# Patient Record
Sex: FEMALE | Race: WHITE | NOT HISPANIC OR LATINO | Employment: FULL TIME | URBAN - METROPOLITAN AREA
[De-identification: names, ages, dates, MRNs, and addresses within clinical notes are randomized per-mention and may not be internally consistent; named-entity substitution may affect disease eponyms.]

---

## 2021-05-28 ENCOUNTER — HOSPITAL ENCOUNTER (EMERGENCY)
Facility: MEDICAL CENTER | Age: 61
End: 2021-05-28
Attending: EMERGENCY MEDICINE
Payer: COMMERCIAL

## 2021-05-28 ENCOUNTER — APPOINTMENT (OUTPATIENT)
Dept: RADIOLOGY | Facility: MEDICAL CENTER | Age: 61
End: 2021-05-28
Attending: EMERGENCY MEDICINE
Payer: COMMERCIAL

## 2021-05-28 VITALS
WEIGHT: 143.3 LBS | HEART RATE: 61 BPM | OXYGEN SATURATION: 99 % | BODY MASS INDEX: 26.37 KG/M2 | DIASTOLIC BLOOD PRESSURE: 75 MMHG | TEMPERATURE: 98.1 F | SYSTOLIC BLOOD PRESSURE: 131 MMHG | HEIGHT: 62 IN | RESPIRATION RATE: 18 BRPM

## 2021-05-28 DIAGNOSIS — R11.2 NAUSEA AND VOMITING, INTRACTABILITY OF VOMITING NOT SPECIFIED, UNSPECIFIED VOMITING TYPE: ICD-10-CM

## 2021-05-28 DIAGNOSIS — S09.90XA CLOSED HEAD INJURY, INITIAL ENCOUNTER: ICD-10-CM

## 2021-05-28 DIAGNOSIS — S01.01XA LACERATION OF SCALP, INITIAL ENCOUNTER: ICD-10-CM

## 2021-05-28 LAB — EKG IMPRESSION: NORMAL

## 2021-05-28 PROCEDURE — 304217 HCHG IRRIGATION SYSTEM

## 2021-05-28 PROCEDURE — 700101 HCHG RX REV CODE 250: Performed by: EMERGENCY MEDICINE

## 2021-05-28 PROCEDURE — 99284 EMERGENCY DEPT VISIT MOD MDM: CPT

## 2021-05-28 PROCEDURE — 70450 CT HEAD/BRAIN W/O DYE: CPT

## 2021-05-28 PROCEDURE — 304999 HCHG REPAIR-SIMPLE/INTERMED LEVEL 1

## 2021-05-28 PROCEDURE — 93005 ELECTROCARDIOGRAM TRACING: CPT

## 2021-05-28 PROCEDURE — 305308 HCHG STAPLER,SKIN,DISP.

## 2021-05-28 RX ORDER — LIDOCAINE HYDROCHLORIDE AND EPINEPHRINE 10; 10 MG/ML; UG/ML
20 INJECTION, SOLUTION INFILTRATION; PERINEURAL ONCE
Status: COMPLETED | OUTPATIENT
Start: 2021-05-28 | End: 2021-05-28

## 2021-05-28 RX ADMIN — LIDOCAINE HYDROCHLORIDE AND EPINEPHRINE 20 ML: 10; 10 INJECTION, SOLUTION INFILTRATION; PERINEURAL at 12:30

## 2021-05-28 NOTE — ED TRIAGE NOTES
"Presents complaining of occipital head pain after incurring a GLF on her tile kitchen floor, earlier this AM.  She reports a consequent LOC.   Chief Complaint   Patient presents with   • T-5000 FALL   • Head Injury     /56   Pulse 62   Temp 36.3 °C (97.3 °F) (Temporal)   Resp 20   Ht 1.575 m (5' 2\")   Wt 65 kg (143 lb 4.8 oz)   SpO2 100%   BMI 26.21 kg/m²      "

## 2021-05-28 NOTE — DISCHARGE INSTRUCTIONS
Rinse blood out of your hair when you get home and then leave the scalp dry for about 3 days.  Return at once if you develop new or worsening symptoms or you would like further evaluation or treatment.  Return here in 10 days for staple removal.

## 2021-05-28 NOTE — ED NOTES
D/C instn reviewed Return for s/s infection or neurologic s/s Pt states feels better D/C ambul Stable reassured condn

## 2021-05-28 NOTE — ED PROVIDER NOTES
"ED Provider Note    CHIEF COMPLAINT  Chief Complaint   Patient presents with   • T-5000 FALL   • Head Injury   • Loss of Consciousness       HPI  Celestina Bond is a 60 y.o. female who presents to the emergency department complaining of injury to the back of the head.  The patient says that she woke up, as usual around 2:30 AM this morning she then ran several miles on her treadmill and felt fine, she then drank celery juice which tasted rancid and shortly after that began experiencing nausea and vomiting.  She felt very weak and states that she \"passed out\" the patient says that she has had episodes of passing out associated with nausea in the past.  Today after passing out she woke up on the floor and noticed that she had a laceration to the occipital part of the scalp.  She is also been having episodes of diarrhea since the onset of symptoms.  Otherwise she does not recognize any exacerbating or alleviating factors or other precipitating events    REVIEW OF SYSTEMS no black or bloody stool or emesis no cervical pain no chest pain or hemoptysis.  All other systems negative    PAST MEDICAL HISTORY  No past medical history on file.    FAMILY HISTORY  No family history on file.    SOCIAL HISTORY  Social History     Socioeconomic History   • Marital status:      Spouse name: Not on file   • Number of children: Not on file   • Years of education: Not on file   • Highest education level: Not on file   Occupational History   • Not on file   Tobacco Use   • Smoking status: Not on file   Substance and Sexual Activity   • Alcohol use: Not on file   • Drug use: Not on file   • Sexual activity: Not on file   Other Topics Concern   • Not on file   Social History Narrative   • Not on file     Social Determinants of Health     Financial Resource Strain:    • Difficulty of Paying Living Expenses:    Food Insecurity:    • Worried About Running Out of Food in the Last Year:    • Ran Out of Food in the Last Year:  " "  Transportation Needs:    • Lack of Transportation (Medical):    • Lack of Transportation (Non-Medical):    Physical Activity:    • Days of Exercise per Week:    • Minutes of Exercise per Session:    Stress:    • Feeling of Stress :    Social Connections:    • Frequency of Communication with Friends and Family:    • Frequency of Social Gatherings with Friends and Family:    • Attends [employer] Services:    • Active Member of Clubs or Organizations:    • Attends Club or Organization Meetings:    • Marital Status:    Intimate Partner Violence:    • Fear of Current or Ex-Partner:    • Emotionally Abused:    • Physically Abused:    • Sexually Abused:        SURGICAL HISTORY  No past surgical history on file.    CURRENT MEDICATIONS  Home Medications    **Home medications have not yet been reviewed for this encounter**         ALLERGIES  Allergies   Allergen Reactions   • Bee Anaphylaxis   • [place] Flavor Anaphylaxis       PHYSICAL EXAM  VITAL SIGNS: /75   Pulse 61   Temp 36.3 °C (97.3 °F) (Temporal)   Resp 18   Ht 1.575 m (5' 2\")   Wt 65 kg (143 lb 4.8 oz)   SpO2 99%   BMI 26.21 kg/m²    Oxygen saturation is interpreted as adequate  Constitutional: The patient is awake she is lucid she carries on a normal conversation, she does seem uncomfortable with nausea at the time of arrival but she is able to understand and appreciate everything I am telling her and currently has the capacity to make her own medical decisions.  HENT: There is a stellate wound to the occipital part of the scalp consistent with her head striking the floor and the skin is highly macerated in that area, total length is approximately 4 cm  Eyes: Pupils round extraocular motion present  Neck: No midline cervical tenderness the patient is moving her neck with no inhibition or apparent difficulty  Cardiovascular: Regular rate and rhythm  Lungs: Clear and equal bilaterally with no apparent difficulty breathing  Abdomen/Back: Soft nontender " nondistended no rebound guarding or peritoneal findings  Skin: Warm and dry  Musculoskeletal: No acute bony deformity  Neurologic: Awake lucid verbal moving all extremities without difficulty    Radiology  CT-HEAD W/O   Final Result      1.  Right parietal scalp laceration without evidence of skull fracture or intracranial hemorrhage.            PROCEDURES  The area about the scalp wound was locally infiltrated with lidocaine and epinephrine to achieve adequate anesthesia.  The area was then irrigated with a jet irrigation system.  The wound was inspected and the skin was found to be highly macerated around the edges of the laceration but there was no deep structure involvement or exposure of skull or galea.  The wound was stapled closed with 4 scalp staples.    MEDICAL DECISION MAKING and DISPOSITION  In the emergency department the patient is lucid and capable of understanding what I am telling her and capable of making her own medical decisions and I have recommended that we place an IV and do an evaluation for syncopal episode but the patient refuses an IV, she refuses any lab work or additional work-up stating that she just wants to make sure her head is okay.  I have offered intravenous medications as well as oral medications for her symptoms of nausea and she declines these as well.  Over time the patient's symptoms do seem to be improving she looks a lot more comfortable but says she still having some nausea when she moves around.  I have once again recommended that we establish an IV, check lab work, provide nausea medications and other interventions but the patient declines.  At this point in time she says she wants to go home, I have reviewed wound care instructions with her she is to keep the area clean and dry.  The patient is advised to return here in 10 days for staple removal and otherwise if she changes her mind or would like further evaluation or treatment or has additional concerns she is to  return here immediately for recheck    IMPRESSION  1.  Nausea and vomiting  2.  Syncopal episode  3.  Centimeter laceration to the scalp stapled in the emergency department         Electronically signed by: Roland Urbina M.D., 5/28/2021 1:47 PM

## 2021-06-06 ENCOUNTER — HOSPITAL ENCOUNTER (EMERGENCY)
Facility: MEDICAL CENTER | Age: 61
End: 2021-06-06
Payer: COMMERCIAL

## 2021-06-06 VITALS
RESPIRATION RATE: 20 BRPM | TEMPERATURE: 97 F | HEART RATE: 60 BPM | HEIGHT: 62 IN | BODY MASS INDEX: 25.96 KG/M2 | WEIGHT: 141.09 LBS | DIASTOLIC BLOOD PRESSURE: 66 MMHG | OXYGEN SATURATION: 95 % | SYSTOLIC BLOOD PRESSURE: 120 MMHG

## 2021-06-06 PROCEDURE — 99281 EMR DPT VST MAYX REQ PHY/QHP: CPT

## 2021-06-06 NOTE — ED TRIAGE NOTES
"Pt was seen in our department the 28 th of this month for an occipital laceration.   4 staples were removed, today.  Wound edges appear well approximated, and in rapid healing process without any sign of infection.  Wound care, and F/U care have been provided.  Pt is urged to return for any fever, or unusual pain affecting the surgical site.  He verbalizes understanding.    Chief Complaint   Patient presents with   • Staple Removal     /66   Pulse 60   Temp 36.1 °C (97 °F) (Temporal)   Resp 20   Ht 1.575 m (5' 2\")   Wt 64 kg (141 lb 1.5 oz)   SpO2 95%   BMI 25.81 kg/m²      "

## 2021-08-05 ENCOUNTER — TELEPHONE (OUTPATIENT)
Dept: CARDIOLOGY | Facility: MEDICAL CENTER | Age: 61
End: 2021-08-05

## 2021-08-05 NOTE — TELEPHONE ENCOUNTER
Called and left message for patient to call back to schedule a sooner cardiology appt. Left direct extension   -Najma

## 2022-07-14 ENCOUNTER — HOSPITAL ENCOUNTER (OUTPATIENT)
Dept: RADIOLOGY | Facility: MEDICAL CENTER | Age: 62
End: 2022-07-14
Payer: COMMERCIAL

## 2022-07-25 ENCOUNTER — HOSPITAL ENCOUNTER (OUTPATIENT)
Dept: LAB | Facility: MEDICAL CENTER | Age: 62
End: 2022-07-25
Attending: INTERNAL MEDICINE
Payer: COMMERCIAL

## 2022-07-25 LAB
ALBUMIN SERPL BCP-MCNC: 4.3 G/DL (ref 3.2–4.9)
ALBUMIN/GLOB SERPL: 1.7 G/DL
ALP SERPL-CCNC: 93 U/L (ref 30–99)
ALT SERPL-CCNC: 11 U/L (ref 2–50)
ANION GAP SERPL CALC-SCNC: 11 MMOL/L (ref 7–16)
AST SERPL-CCNC: 18 U/L (ref 12–45)
BASOPHILS # BLD AUTO: 1.1 % (ref 0–1.8)
BASOPHILS # BLD: 0.03 K/UL (ref 0–0.12)
BILIRUB SERPL-MCNC: 0.6 MG/DL (ref 0.1–1.5)
BUN SERPL-MCNC: 15 MG/DL (ref 8–22)
CALCIUM SERPL-MCNC: 9 MG/DL (ref 8.4–10.2)
CHLORIDE SERPL-SCNC: 101 MMOL/L (ref 96–112)
CHOLEST SERPL-MCNC: 187 MG/DL (ref 100–199)
CO2 SERPL-SCNC: 24 MMOL/L (ref 20–33)
CREAT SERPL-MCNC: 0.57 MG/DL (ref 0.5–1.4)
EOSINOPHIL # BLD AUTO: 0.1 K/UL (ref 0–0.51)
EOSINOPHIL NFR BLD: 3.6 % (ref 0–6.9)
ERYTHROCYTE [DISTWIDTH] IN BLOOD BY AUTOMATED COUNT: 42.8 FL (ref 35.9–50)
EST. AVERAGE GLUCOSE BLD GHB EST-MCNC: 100 MG/DL
FASTING STATUS PATIENT QL REPORTED: NORMAL
GFR SERPLBLD CREATININE-BSD FMLA CKD-EPI: 104 ML/MIN/1.73 M 2
GLOBULIN SER CALC-MCNC: 2.5 G/DL (ref 1.9–3.5)
GLUCOSE SERPL-MCNC: 90 MG/DL (ref 65–99)
HBA1C MFR BLD: 5.1 % (ref 4–5.6)
HCT VFR BLD AUTO: 38.4 % (ref 37–47)
HDLC SERPL-MCNC: 66 MG/DL
HGB BLD-MCNC: 12.7 G/DL (ref 12–16)
IMM GRANULOCYTES # BLD AUTO: 0.01 K/UL (ref 0–0.11)
IMM GRANULOCYTES NFR BLD AUTO: 0.4 % (ref 0–0.9)
LDLC SERPL CALC-MCNC: 107 MG/DL
LYMPHOCYTES # BLD AUTO: 0.66 K/UL (ref 1–4.8)
LYMPHOCYTES NFR BLD: 23.7 % (ref 22–41)
MCH RBC QN AUTO: 30.5 PG (ref 27–33)
MCHC RBC AUTO-ENTMCNC: 33.1 G/DL (ref 33.6–35)
MCV RBC AUTO: 92.1 FL (ref 81.4–97.8)
MONOCYTES # BLD AUTO: 0.2 K/UL (ref 0–0.85)
MONOCYTES NFR BLD AUTO: 7.2 % (ref 0–13.4)
NEUTROPHILS # BLD AUTO: 1.78 K/UL (ref 2–7.15)
NEUTROPHILS NFR BLD: 64 % (ref 44–72)
NRBC # BLD AUTO: 0 K/UL
NRBC BLD-RTO: 0 /100 WBC
PLATELET # BLD AUTO: 186 K/UL (ref 164–446)
PMV BLD AUTO: 11 FL (ref 9–12.9)
POTASSIUM SERPL-SCNC: 4.3 MMOL/L (ref 3.6–5.5)
PROT SERPL-MCNC: 6.8 G/DL (ref 6–8.2)
RBC # BLD AUTO: 4.17 M/UL (ref 4.2–5.4)
SODIUM SERPL-SCNC: 136 MMOL/L (ref 135–145)
TRIGL SERPL-MCNC: 68 MG/DL (ref 0–149)
TSH SERPL DL<=0.005 MIU/L-ACNC: 1.33 UIU/ML (ref 0.38–5.33)
URATE SERPL-MCNC: 3.6 MG/DL (ref 1.9–8.2)
WBC # BLD AUTO: 2.8 K/UL (ref 4.8–10.8)

## 2022-07-25 PROCEDURE — 80053 COMPREHEN METABOLIC PANEL: CPT

## 2022-07-25 PROCEDURE — 83036 HEMOGLOBIN GLYCOSYLATED A1C: CPT

## 2022-07-25 PROCEDURE — 84443 ASSAY THYROID STIM HORMONE: CPT

## 2022-07-25 PROCEDURE — 84550 ASSAY OF BLOOD/URIC ACID: CPT

## 2022-07-25 PROCEDURE — 85025 COMPLETE CBC W/AUTO DIFF WBC: CPT

## 2022-07-25 PROCEDURE — 36415 COLL VENOUS BLD VENIPUNCTURE: CPT

## 2022-07-25 PROCEDURE — 80061 LIPID PANEL: CPT

## 2022-08-11 ENCOUNTER — HOSPITAL ENCOUNTER (OUTPATIENT)
Dept: RADIOLOGY | Facility: MEDICAL CENTER | Age: 62
End: 2022-08-11
Attending: INTERNAL MEDICINE
Payer: COMMERCIAL

## 2022-08-11 DIAGNOSIS — Z12.31 VISIT FOR SCREENING MAMMOGRAM: ICD-10-CM

## 2022-08-11 PROCEDURE — 77063 BREAST TOMOSYNTHESIS BI: CPT

## 2023-06-25 ENCOUNTER — APPOINTMENT (OUTPATIENT)
Dept: RADIOLOGY | Facility: MEDICAL CENTER | Age: 63
End: 2023-06-25
Attending: EMERGENCY MEDICINE
Payer: COMMERCIAL

## 2023-06-25 ENCOUNTER — HOSPITAL ENCOUNTER (EMERGENCY)
Facility: MEDICAL CENTER | Age: 63
End: 2023-06-25
Attending: EMERGENCY MEDICINE
Payer: COMMERCIAL

## 2023-06-25 VITALS
WEIGHT: 141 LBS | BODY MASS INDEX: 25.95 KG/M2 | HEIGHT: 62 IN | TEMPERATURE: 97.1 F | HEART RATE: 52 BPM | DIASTOLIC BLOOD PRESSURE: 68 MMHG | RESPIRATION RATE: 18 BRPM | SYSTOLIC BLOOD PRESSURE: 119 MMHG | OXYGEN SATURATION: 99 %

## 2023-06-25 DIAGNOSIS — R55 VASOVAGAL EPISODE: ICD-10-CM

## 2023-06-25 DIAGNOSIS — R55 SYNCOPE, UNSPECIFIED SYNCOPE TYPE: ICD-10-CM

## 2023-06-25 LAB
ALBUMIN SERPL BCP-MCNC: 4.1 G/DL (ref 3.2–4.9)
ALBUMIN/GLOB SERPL: 1.7 G/DL
ALP SERPL-CCNC: 102 U/L (ref 30–99)
ALT SERPL-CCNC: 14 U/L (ref 2–50)
ANION GAP SERPL CALC-SCNC: 13 MMOL/L (ref 7–16)
APTT PPP: 24.1 SEC (ref 24.7–36)
AST SERPL-CCNC: 19 U/L (ref 12–45)
BASOPHILS # BLD AUTO: 1.2 % (ref 0–1.8)
BASOPHILS # BLD: 0.04 K/UL (ref 0–0.12)
BILIRUB SERPL-MCNC: 0.4 MG/DL (ref 0.1–1.5)
BUN SERPL-MCNC: 18 MG/DL (ref 8–22)
CALCIUM ALBUM COR SERPL-MCNC: 9.3 MG/DL (ref 8.5–10.5)
CALCIUM SERPL-MCNC: 9.4 MG/DL (ref 8.4–10.2)
CHLORIDE SERPL-SCNC: 106 MMOL/L (ref 96–112)
CK SERPL-CCNC: 167 U/L (ref 0–154)
CO2 SERPL-SCNC: 22 MMOL/L (ref 20–33)
CREAT SERPL-MCNC: 0.68 MG/DL (ref 0.5–1.4)
EKG IMPRESSION: NORMAL
EOSINOPHIL # BLD AUTO: 0.1 K/UL (ref 0–0.51)
EOSINOPHIL NFR BLD: 2.9 % (ref 0–6.9)
ERYTHROCYTE [DISTWIDTH] IN BLOOD BY AUTOMATED COUNT: 42.1 FL (ref 35.9–50)
GFR SERPLBLD CREATININE-BSD FMLA CKD-EPI: 99 ML/MIN/1.73 M 2
GLOBULIN SER CALC-MCNC: 2.4 G/DL (ref 1.9–3.5)
GLUCOSE SERPL-MCNC: 114 MG/DL (ref 65–99)
HCT VFR BLD AUTO: 39.2 % (ref 37–47)
HGB BLD-MCNC: 13.2 G/DL (ref 12–16)
IMM GRANULOCYTES # BLD AUTO: 0.03 K/UL (ref 0–0.11)
IMM GRANULOCYTES NFR BLD AUTO: 0.9 % (ref 0–0.9)
INR PPP: 1.04 (ref 0.87–1.13)
LYMPHOCYTES # BLD AUTO: 0.87 K/UL (ref 1–4.8)
LYMPHOCYTES NFR BLD: 25.1 % (ref 22–41)
MAGNESIUM SERPL-MCNC: 2 MG/DL (ref 1.5–2.5)
MCH RBC QN AUTO: 30.8 PG (ref 27–33)
MCHC RBC AUTO-ENTMCNC: 33.7 G/DL (ref 32.2–35.5)
MCV RBC AUTO: 91.6 FL (ref 81.4–97.8)
MONOCYTES # BLD AUTO: 0.32 K/UL (ref 0–0.85)
MONOCYTES NFR BLD AUTO: 9.2 % (ref 0–13.4)
NEUTROPHILS # BLD AUTO: 2.11 K/UL (ref 1.82–7.42)
NEUTROPHILS NFR BLD: 60.7 % (ref 44–72)
NRBC # BLD AUTO: 0 K/UL
NRBC BLD-RTO: 0 /100 WBC (ref 0–0.2)
PLATELET # BLD AUTO: 166 K/UL (ref 164–446)
PMV BLD AUTO: 11.7 FL (ref 9–12.9)
POTASSIUM SERPL-SCNC: 3.8 MMOL/L (ref 3.6–5.5)
PROT SERPL-MCNC: 6.5 G/DL (ref 6–8.2)
PROTHROMBIN TIME: 13.5 SEC (ref 12–14.6)
RBC # BLD AUTO: 4.28 M/UL (ref 4.2–5.4)
SODIUM SERPL-SCNC: 141 MMOL/L (ref 135–145)
WBC # BLD AUTO: 3.5 K/UL (ref 4.8–10.8)

## 2023-06-25 PROCEDURE — 85730 THROMBOPLASTIN TIME PARTIAL: CPT

## 2023-06-25 PROCEDURE — 82550 ASSAY OF CK (CPK): CPT

## 2023-06-25 PROCEDURE — 36415 COLL VENOUS BLD VENIPUNCTURE: CPT

## 2023-06-25 PROCEDURE — 80053 COMPREHEN METABOLIC PANEL: CPT

## 2023-06-25 PROCEDURE — 99284 EMERGENCY DEPT VISIT MOD MDM: CPT

## 2023-06-25 PROCEDURE — 83735 ASSAY OF MAGNESIUM: CPT

## 2023-06-25 PROCEDURE — 85610 PROTHROMBIN TIME: CPT

## 2023-06-25 PROCEDURE — 70450 CT HEAD/BRAIN W/O DYE: CPT

## 2023-06-25 PROCEDURE — 85025 COMPLETE CBC W/AUTO DIFF WBC: CPT

## 2023-06-25 PROCEDURE — 93005 ELECTROCARDIOGRAM TRACING: CPT | Performed by: EMERGENCY MEDICINE

## 2023-06-25 ASSESSMENT — FIBROSIS 4 INDEX: FIB4 SCORE: 1.75

## 2023-06-25 NOTE — ED NOTES
Pt denied having any needs at this time, no distress noted;    Pt aware that she is waiting for imaging result;

## 2023-06-25 NOTE — ED NOTES
Med rec updated and complete, per pt   Allergies reviewed, per pt  Pt reports that she takes more vitamins, but remember all of them right now.  Pt reports no prescription medications in the last 30 days or longer.  Pt reports no antibiotics in the last 30 days.

## 2023-06-25 NOTE — ED PROVIDER NOTES
ED Provider Note    CHIEF COMPLAINT  Chief Complaint   Patient presents with    Seizure     Pt BIB EMS for seizure-like activity while she was getting a pedicure - no postictal activity, currently AAOx4, Pt did not fall and/or hit her head; Pt stated that she did have a Sz 20-yrs ago - does not take any seizure medications at this time; Pt also stated that she did a 4-mile walk c her dog and a 20-mile bike ride on her stationary bike this morning, Pt stated that she normally doesn't do that much exercise everyday but does exercise everyday;        EXTERNAL RECORDS REVIEWED  Outpatient Notes loss of consciousness back in 5/28/2021 treated at [facility].  Seen on February 29, 2020 for syncope    HPI/ROS  LIMITATION TO HISTORY   Select: : None  OUTSIDE HISTORIAN(S):       Celestina Bond is a 62 y.o. female who presents with loss of consciousness while getting a pedicure.  She states that prior to losing consciousness, she was having her left great toe worked on and they were digging around the toe and it was extremely painful.  The patient lost consciousness for less than a minute and came back to.  She subsequently vomited and feels nauseous.    Patient states that she is extremely healthy at baseline.  She went on a 20 mile bike ride and a 2 mile walk today without any issues.  She exercises pretty much every day as well.  Has had suspicion of a potential seizure episode 1 other time in the past that was isolated.    She has had a syncopal event in the past as well after drinking bad celery juice.  Denies any known cardiac issues.  No recent fevers or illness.  Was in her usual state of health prior to the incident    PAST MEDICAL HISTORY       SURGICAL HISTORY  patient denies any surgical history    FAMILY HISTORY  No family history on file.    SOCIAL HISTORY  Social History     Tobacco Use    Smoking status: Former    Smokeless tobacco: Never   Substance and Sexual Activity    Alcohol use: Not Currently  "   Drug use: Not Currently    Sexual activity: Not on file       CURRENT MEDICATIONS  Home Medications    **Home medications have not yet been reviewed for this encounter**         ALLERGIES  Allergies   Allergen Reactions    Bee Anaphylaxis    [place] Flavor Anaphylaxis       PHYSICAL EXAM  VITAL SIGNS: Temp 35.9 °C (96.7 °F) (Temporal)   Resp 18   Ht 1.575 m (5' 2\")   Wt 64 kg (141 lb)   BMI 25.79 kg/m²    Constitutional: Alert in no apparent distress.  HENT: No signs of trauma, Bilateral external ears normal, Nose normal.   Eyes: Pupils are equal and reactive, Conjunctiva normal, Non-icteric.   Neck: Normal range of motion, Supple, No stridor.   Cardiovascular: Regular rate and rhythm.   Thorax & Lungs: Normal breath sounds, No respiratory distress, No wheezing  Abdomen: Soft, No tenderness, No peritoneal signs, No masses.   Skin: Warm, Dry, No erythema, No rash.   Back: No bony tenderness, No CVA tenderness.   Extremities: Intact distal pulses, No edema, No cyanosis  Musculoskeletal: Good range of motion in all major joints. No major deformities noted.   Neurologic: Alert, Normal motor function, Normal sensory function, No focal deficits noted.       DIAGNOSTIC STUDIES / PROCEDURES  EKG  I have independently interpreted this EKG  Results for orders placed or performed during the hospital encounter of 23   EKG   Result Value Ref Range    Report       [facility] Emergency Dept.    Test Date:  2023  Pt Name:    MARK BROOKS               Department: [facility]  MRN:        [id number]                      [facility]  Gender:     Female                       Technician: EVELIN  :        1960                   Requested By:CK WEEKS  Order #:    [id number]                    Reading MD: CK WEEKS MD    Measurements  Intervals                                Axis  Rate:       52                           P:          52  IA:         165                          QRS:       "  -48  QRSD:       99                           T:          47  QT:         441  QTc:        411    Interpretive Statements  Sinus rhythm  Left anterior fascicular block  Low voltage, precordial leads  Baseline wander in lead(s) V2  Compared to ECG 05/28/2021 11:48:54  Sinus bradycardia no longer present  Electronically Signed On 06- 16:05:15 PDT by CK WEEKS MD           LABS  Labs Reviewed   CBC WITH DIFFERENTIAL - Abnormal; Notable for the following components:       Result Value    WBC 3.5 (*)     Lymphs (Absolute) 0.87 (*)     All other components within normal limits    Narrative:     Indicate which anticoagulants the patient is on:->NONE   COMP METABOLIC PANEL - Abnormal; Notable for the following components:    Glucose 114 (*)     Alkaline Phosphatase 102 (*)     All other components within normal limits    Narrative:     Indicate which anticoagulants the patient is on:->NONE   CREATINE KINASE - Abnormal; Notable for the following components:    CPK Total 167 (*)     All other components within normal limits    Narrative:     Indicate which anticoagulants the patient is on:->NONE   APTT - Abnormal; Notable for the following components:    APTT 24.1 (*)     All other components within normal limits    Narrative:     Indicate which anticoagulants the patient is on:->NONE   PROTHROMBIN TIME    Narrative:     Indicate which anticoagulants the patient is on:->NONE   MAGNESIUM    Narrative:     Indicate which anticoagulants the patient is on:->NONE   CORRECTED CALCIUM    Narrative:     Indicate which anticoagulants the patient is on:->NONE   ESTIMATED GFR    Narrative:     Indicate which anticoagulants the patient is on:->NONE         RADIOLOGY  I have independently interpreted the diagnostic imaging associated with this visit and am waiting the final reading from the radiologist.   My preliminary interpretation is as follows: No intracranial mass or hemorrhage  Radiologist interpretation:   CT-HEAD W/O    Final Result      No acute intracranial abnormality is identified.      Several slices at the level of the midbrain are obscured by artifact from a dense right earring          COURSE & MEDICAL DECISION MAKING    ED Observation Status? No; Patient does not meet criteria for ED Observation.     INITIAL ASSESSMENT, COURSE AND PLAN  Care Narrative: 62 y.o. female who is rather healthy at baseline and lives a very active lifestyle.  Presenting after brief loss of consciousness while getting a pedicure.  She notes that a ingrown toenail was being worked on that was extremely painful for her and during that procedure the patient lost consciousness.  It was uncertain whether or not she sustained a seizure though did not appear to have clear evidence of a postictal state.  Has had syncope in the past.  Possibly has had 1 seizure in the past though uncertain given that the work-up was completely negative in the past.    Laboratory studies were performed here which were largely unremarkable.  No anemia.  No sniffing metabolic abnormalities.  EKG showing normal sinus rhythm without arrhythmia.  Patient is currently asymptomatic without neurologic deficit.  Resting comfortably.  CT head was performed given questionable seizure versus syncope.  It was found to be unremarkable.    Symptoms most likely related to a vasovagal event while the patient was having a pedicure and receiving a very painful procedure to clean her left great toe for potential ingrown nail.    Patient has been monitored here in the emergency department and resting comfortably and in no distress.  Appears stable for discharge at this time.    HTN/IDDM FOLLOW UP:  The patient is referred to a primary physician for blood pressure management, diabetic screening, and for all other preventive health concerns      ADDITIONAL PROBLEM LIST      DISPOSITION AND DISCUSSIONS  I have discussed management of the patient with the following physicians and JIN's:       Discussion of management with other [facility] or appropriate source(s): None     Escalation of care considered, and ultimately not performed:after discussion with the patient / family, they have elected to decline an escalation in care    Barriers to care at this time, including but not limited to:    .     Decision tools and prescription drugs considered including, but not limited to:    .    FINAL DIAGNOSIS  1. Syncope, unspecified syncope type    2. Vasovagal episode           Electronically signed by: Tucker Knight M.D., 6/25/2023 12:36 PM

## 2023-06-25 NOTE — ED TRIAGE NOTES
"Chief Complaint   Patient presents with    Seizure     Pt BIB EMS for seizure-like activity while she was getting a pedicure - no postictal activity, currently AAOx4, Pt did not fall and/or hit her head; Pt stated that she did have a Sz 20-yrs ago - does not take any seizure medications at this time; Pt also stated that she did a 4-mile walk c her dog and a 20-mile bike ride on her stationary bike this morning, Pt stated that she normally doesn't do that much exercise everyday but does exercise everyday;       Seizure pads placed on both sides of gurney;    Pt refused all medications offered by EMS, ERP notified;    ED Triage Vitals   Enc Vitals Group      Blood Pressure 06/25/23 1246 125/61      Pulse 06/25/23 1312 (Abnormal) 51      Respiration 06/25/23 1236 18      Temperature 06/25/23 1236 35.9 °C (96.7 °F)      Temp src 06/25/23 1236 Temporal      Pulse Oximetry 06/25/23 1312 100 %      Weight 06/25/23 1236 64 kg (141 lb)      Height 06/25/23 1236 1.575 m (5' 2\")      Head Circumference --       Peak Flow --       Pain Score --       Pain Loc --       Pain Edu? --       Excl. in GC? --       "